# Patient Record
Sex: FEMALE | Race: WHITE | Employment: OTHER | URBAN - METROPOLITAN AREA
[De-identification: names, ages, dates, MRNs, and addresses within clinical notes are randomized per-mention and may not be internally consistent; named-entity substitution may affect disease eponyms.]

---

## 2017-03-12 DIAGNOSIS — I10 ESSENTIAL HYPERTENSION, BENIGN: ICD-10-CM

## 2017-03-12 NOTE — TELEPHONE ENCOUNTER
cozaar      Last Written Prescription Date: 11/07/16   Last Fill Quantity: 60, # refills: 11  Last Office Visit with [facility] or [facility] prescribing provider: 11/04/16       Potassium   Date Value Ref Range Status   10/19/2016 4.4 3.4 - 5.3 mmol/L Final     Creatinine   Date Value Ref Range Status   10/19/2016 0.90 0.52 - 1.04 mg/dL Final     BP Readings from Last 3 Encounters:   12/07/16 139/81   12/01/16 152/82   11/04/16 136/82

## 2017-03-13 RX ORDER — LOSARTAN POTASSIUM 50 MG/1
100 TABLET ORAL DAILY
Qty: 60 TABLET | Refills: 4 | Status: SHIPPED | OUTPATIENT
Start: 2017-03-13 | End: 2017-08-25

## 2017-04-05 ENCOUNTER — RADIANT APPOINTMENT (OUTPATIENT)
Dept: MAMMOGRAPHY | Facility: CLINIC | Age: 72
End: 2017-04-05
Attending: FAMILY MEDICINE
Payer: COMMERCIAL

## 2017-04-05 DIAGNOSIS — Z12.31 VISIT FOR SCREENING MAMMOGRAM: ICD-10-CM

## 2017-04-05 PROCEDURE — G0202 SCR MAMMO BI INCL CAD: HCPCS | Mod: TC

## 2017-05-03 ENCOUNTER — OFFICE VISIT (OUTPATIENT)
Dept: AUDIOLOGY | Facility: CLINIC | Age: 72
End: 2017-05-03
Payer: COMMERCIAL

## 2017-05-03 DIAGNOSIS — H90.3 SENSORINEURAL HEARING LOSS, BILATERAL: Primary | ICD-10-CM

## 2017-05-03 PROCEDURE — V5299 HEARING SERVICE: HCPCS | Performed by: AUDIOLOGIST

## 2017-05-03 NOTE — MR AVS SNAPSHOT
After Visit Summary   5/3/2017    Meliza Pereira    MRN: [id number]           Patient Information     Date Of Birth          1945        Visit Information        Provider Department      5/3/2017 3:00 PM Ruba Fitzpatrick AuD [facility]        Today's Diagnoses     Sensorineural hearing loss, bilateral    -  1       Follow-ups after your visit        Who to contact     If you have questions or need follow up information about today's clinic visit or your schedule please contact [facility] directly at [phone].  Normal or non-critical lab and imaging results will be communicated to you by Galavantierhart, letter or phone within 4 business days after the clinic has received the results. If you do not hear from us within 7 days, please contact the clinic through Blippext or phone. If you have a critical or abnormal lab result, we will notify you by phone as soon as possible.  Submit refill requests through SupplyHog or call your pharmacy and they will forward the refill request to us. Please allow 3 business days for your refill to be completed.          Additional Information About Your Visit        MyChart Information     SupplyHog gives you secure access to your electronic health record. If you see a primary care provider, you can also send messages to your care team and make appointments. If you have questions, please call your primary care clinic.  If you do not have a primary care provider, please call [phone] and they will assist you.        Care EveryWhere ID     This is your Care EveryWhere ID. This could be used by other organizations to access your [place] medical records  [id number]         Blood Pressure from Last 3 Encounters:   12/07/16 139/81   12/01/16 152/82   11/04/16 136/82    Weight from Last 3 Encounters:   12/07/16 150 lb 12.8 oz (68.4 kg)   11/04/16 156 lb 12.8 oz (71.1 kg)   10/24/16 159 lb (72.1 kg)              We Performed the Following      HEARING AID CHECK/NO CHARGE        Primary Care Provider Office Phone # Fax #    Maggi Lawler [phone]       [address] [address]        Thank you!     Thank you for choosing [facility]  for your care. Our goal is always to provide you with excellent care. Hearing back from our patients is one way we can continue to improve our services. Please take a few minutes to complete the written survey that you may receive in the mail after your visit with us. Thank you!             Your Updated Medication List - Protect others around you: Learn how to safely use, store and throw away your medicines at www.disposemymeds.org.          This list is accurate as of: 5/3/17  4:19 PM.  Always use your most recent med list.                   Brand Name Dispense Instructions for use    aspirin 81 MG tablet      Take 1 tablet by mouth daily.       CLARITIN 10 MG tablet   Generic drug:  loratadine     30 tablet    Take 1 tablet (10 mg) by mouth daily as needed for allergies       glucosamine-chondroitin 500-400 MG Caps per capsule      Take 3 capsules by mouth daily       ibuprofen 200 MG tablet    ADVIL/MOTRIN     Take 3 tablets (600 mg) by mouth every 6 hours as needed for pain (mild)       losartan 50 MG tablet    COZAAR    60 tablet    Take 2 tablets (100 mg) by mouth daily       omeprazole 20 MG CR capsule    priLOSEC    30 capsule    Take 1 capsule (20 mg) by mouth daily       UNABLE TO FIND      MEDICATION NAME: Isotonix

## 2017-05-03 NOTE — PROGRESS NOTES
72 year old female comes in with her 2010 Unitron Latitiude 16 Moxi hearing aids. She reports she is having trouble with the fit of the left hearing aid. She states she has not consistently used the hearing aids.    Examination reveals the retention tail is removed. Replaced left retention tail. Verified hearing aid functionality. See chart in the hearing aid room.    Long discussion on new hearing aid technology.     Recommend return to clinic for a hearing evaluation as she feels her hearing is much worse.    Ruba PROCTOR, #[id number]

## 2017-05-04 ENCOUNTER — MYC MEDICAL ADVICE (OUTPATIENT)
Dept: FAMILY MEDICINE | Facility: CLINIC | Age: 72
End: 2017-05-04

## 2017-05-04 DIAGNOSIS — H90.3 SENSORINEURAL HEARING LOSS, BILATERAL: Primary | ICD-10-CM

## 2017-05-16 ENCOUNTER — OFFICE VISIT (OUTPATIENT)
Dept: AUDIOLOGY | Facility: CLINIC | Age: 72
End: 2017-05-16
Payer: COMMERCIAL

## 2017-05-16 DIAGNOSIS — H90.3 SENSORINEURAL HEARING LOSS, BILATERAL: Primary | ICD-10-CM

## 2017-05-16 PROCEDURE — 92557 COMPREHENSIVE HEARING TEST: CPT | Performed by: AUDIOLOGIST

## 2017-05-16 NOTE — MR AVS SNAPSHOT
After Visit Summary   5/16/2017    Meliza Pereira    MRN: [id number]           Patient Information     Date Of Birth          1945        Visit Information        Provider Department      5/16/2017 1:00 PM Ruba Fitzpatrick AuD [facility]        Today's Diagnoses     Sensorineural hearing loss, bilateral    -  1       Follow-ups after your visit        Your next 10 appointments already scheduled     May 17, 2017 10:00 AM CDT   Return Visit with Prashanth Schroeder   [facility]    [address] [address]   [phone]              Who to contact     If you have questions or need follow up information about today's clinic visit or your schedule please contact [facility] directly at [phone].  Normal or non-critical lab and imaging results will be communicated to you by OmnyPayhart, letter or phone within 4 business days after the clinic has received the results. If you do not hear from us within 7 days, please contact the clinic through OmnyPayhart or phone. If you have a critical or abnormal lab result, we will notify you by phone as soon as possible.  Submit refill requests through Fave Media or call your pharmacy and they will forward the refill request to us. Please allow 3 business days for your refill to be completed.          Additional Information About Your Visit        MyChart Information     Fave Media gives you secure access to your electronic health record. If you see a primary care provider, you can also send messages to your care team and make appointments. If you have questions, please call your primary care clinic.  If you do not have a primary care provider, please call [phone] and they will assist you.        Care EveryWhere ID     This is your Care EveryWhere ID. This could be used by other organizations to access your [place] medical records  [id number]         Blood Pressure from Last 3  Encounters:   12/07/16 139/81   12/01/16 152/82   11/04/16 136/82    Weight from Last 3 Encounters:   12/07/16 150 lb 12.8 oz (68.4 kg)   11/04/16 156 lb 12.8 oz (71.1 kg)   10/24/16 159 lb (72.1 kg)              We Performed the Following     AUDIOGRAM/TYMPANOGRAM - INTERFACE     COMPREHENSIVE HEARING TEST        Primary Care Provider Office Phone # Fax #    Maggi Lawler [phone]       [facility] [address] [facility] [address]        Thank you!     Thank you for choosing [facility]  for your care. Our goal is always to provide you with excellent care. Hearing back from our patients is one way we can continue to improve our services. Please take a few minutes to complete the written survey that you may receive in the mail after your visit with us. Thank you!             Your Updated Medication List - Protect others around you: Learn how to safely use, store and throw away your medicines at www.disposemymeds.org.          This list is accurate as of: 5/16/17  3:30 PM.  Always use your most recent med list.                   Brand Name Dispense Instructions for use    aspirin 81 MG tablet      Take 1 tablet by mouth daily.       CLARITIN 10 MG tablet   Generic drug:  loratadine     30 tablet    Take 1 tablet (10 mg) by mouth daily as needed for allergies       glucosamine-chondroitin 500-400 MG Caps per capsule      Take 3 capsules by mouth daily       ibuprofen 200 MG tablet    ADVIL/MOTRIN     Take 3 tablets (600 mg) by mouth every 6 hours as needed for pain (mild)       losartan 50 MG tablet    COZAAR    60 tablet    Take 2 tablets (100 mg) by mouth daily       omeprazole 20 MG CR capsule    priLOSEC    30 capsule    Take 1 capsule (20 mg) by mouth daily       UNABLE TO FIND      MEDICATION NAME: Isotonix

## 2017-05-16 NOTE — PROGRESS NOTES
AUDIOLOGY REPORT    SUBJECTIVE:  Meliza Pereira is a 72 year old female who was seen in the Audiology Clinic at [facility] for an audiologic evaluation, referred by Dr. Lawler.  The patient has been seen previously in this clinic for assessment and results indicated a mild to moderate sensorineural hearing loss bilaterally. The patient reports she is having more difficulty with her hearing. She reports she has gone back to wearing her 2010 Unitron Latitude 10 Moxi hearing aids. The patient denies bilateral tinnitus, bilateral otalgia and bilateral drainage.     OBJECTIVE:  Otoscopic exam indicates ears are clear of cerumen bilaterally     Pure Tone Thresholds assessed using conventional audiometry with good  reliability from 250-8000 Hz bilaterally using insert earphones     RIGHT:  borderline-normal and moderate sensorineural hearing loss    LEFT:    borderline-normal and moderate sensorineural hearing loss    Tympanogram:    RIGHT: DNT    LEFT:   DNT    Speech Reception Threshold:    RIGHT: 30 dB HL    LEFT:   30 dB HL  Word Recognition Score:     RIGHT: 88% at 60 dB HL using W22 recorded word list.    LEFT:   92% at 70 dB HL using W22 recorded word list.      ASSESSMENT:   Borderline normal sloping to moderate sensorineural hearing loss bilaterally.     Compared to patient's previous audiogram dated 4/29/2010, hearing has improved in the lower tones and decreased slightly at 2000 Hz bilaterally. Today s results were discussed with the patient in detail.     PLAN: It is recommended that the patient return for hearing aid verification and reprogramming or to obtain newer hearing aids.. Patient was counseled regarding hearing loss and impact on communication. Patient is a good candidate for amplification at this time.A [facility] information packet was given to the patient. Please call this clinic with questions regarding these results or recommendations.        Ruba Fitzpatrick M.A.  F-AAA  Clinical audiologist [place] # [id number]  5/16/2017

## 2017-05-17 ENCOUNTER — OFFICE VISIT (OUTPATIENT)
Dept: AUDIOLOGY | Facility: CLINIC | Age: 72
End: 2017-05-17
Payer: COMMERCIAL

## 2017-05-17 DIAGNOSIS — H90.3 SENSORINEURAL HEARING LOSS, BILATERAL: Primary | ICD-10-CM

## 2017-05-17 PROCEDURE — 92593 HC HEARING AID CHECK, BINAURAL: CPT | Performed by: AUDIOLOGIST

## 2017-05-17 PROCEDURE — V5020 CONFORMITY EVALUATION: HCPCS | Performed by: AUDIOLOGIST

## 2017-05-17 NOTE — PROGRESS NOTES
72 year old female comes in for hearing aid reprogramming and verification. She is currently wearing 2010 Unitron Latitiude 16 Moxi hearing aids. She states she never wore the hearing aids all that much. She has been using them more the last few months but feels she is not hearing well with them.    Verified hearing aid functionality.      Hearing aids were reprogrammed and verified match to NAL-NL1 targets. Patient reports she is able to hear much better with these settings. Reviewed hearing aid settings, programs and volume control. See chart in the hearing aid room.     Return to clinic for hearing aid reprogramming in next 60 days.     Ruba CHAPPELL-BRENDON, #[id number]

## 2017-05-17 NOTE — MR AVS SNAPSHOT
After Visit Summary   5/17/2017    Meliza Pereira    MRN: [id number]           Patient Information     Date Of Birth          1945        Visit Information        Provider Department      5/17/2017 10:00 AM Ruba Fitzpatrick AuD [facility]        Today's Diagnoses     Sensorineural hearing loss, bilateral    -  1       Follow-ups after your visit        Who to contact     If you have questions or need follow up information about today's clinic visit or your schedule please contact [facility] directly at [phone].  Normal or non-critical lab and imaging results will be communicated to you by Wireless Safetyhart, letter or phone within 4 business days after the clinic has received the results. If you do not hear from us within 7 days, please contact the clinic through ProNova Solutionst or phone. If you have a critical or abnormal lab result, we will notify you by phone as soon as possible.  Submit refill requests through AppCard or call your pharmacy and they will forward the refill request to us. Please allow 3 business days for your refill to be completed.          Additional Information About Your Visit        MyChart Information     AppCard gives you secure access to your electronic health record. If you see a primary care provider, you can also send messages to your care team and make appointments. If you have questions, please call your primary care clinic.  If you do not have a primary care provider, please call [phone] and they will assist you.        Care EveryWhere ID     This is your Care EveryWhere ID. This could be used by other organizations to access your [place] medical records  [id number]         Blood Pressure from Last 3 Encounters:   12/07/16 139/81   12/01/16 152/82   11/04/16 136/82    Weight from Last 3 Encounters:   12/07/16 150 lb 12.8 oz (68.4 kg)   11/04/16 156 lb 12.8 oz (71.1 kg)   10/24/16 159 lb (72.1 kg)              We Performed the Following      HEARING AID CHECK, BINAURAL     HEARING AID CONFORMITY EVALUATION        Primary Care Provider Office Phone # Fax #    Maggi Gerald Lawler [phone]       [facility] [address] [place] [address]        Thank you!     Thank you for choosing [facility]  for your care. Our goal is always to provide you with excellent care. Hearing back from our patients is one way we can continue to improve our services. Please take a few minutes to complete the written survey that you may receive in the mail after your visit with us. Thank you!             Your Updated Medication List - Protect others around you: Learn how to safely use, store and throw away your medicines at www.disposemymeds.org.          This list is accurate as of: 5/17/17 11:02 AM.  Always use your most recent med list.                   Brand Name Dispense Instructions for use    aspirin 81 MG tablet      Take 1 tablet by mouth daily.       CLARITIN 10 MG tablet   Generic drug:  loratadine     30 tablet    Take 1 tablet (10 mg) by mouth daily as needed for allergies       glucosamine-chondroitin 500-400 MG Caps per capsule      Take 3 capsules by mouth daily       ibuprofen 200 MG tablet    ADVIL/MOTRIN     Take 3 tablets (600 mg) by mouth every 6 hours as needed for pain (mild)       losartan 50 MG tablet    COZAAR    60 tablet    Take 2 tablets (100 mg) by mouth daily       omeprazole 20 MG CR capsule    priLOSEC    30 capsule    Take 1 capsule (20 mg) by mouth daily       UNABLE TO FIND      MEDICATION NAME: Isotonix

## 2017-05-30 ENCOUNTER — TELEPHONE (OUTPATIENT)
Dept: AUDIOLOGY | Facility: CLINIC | Age: 72
End: 2017-05-30

## 2017-05-30 ENCOUNTER — OFFICE VISIT (OUTPATIENT)
Dept: AUDIOLOGY | Facility: CLINIC | Age: 72
End: 2017-05-30

## 2017-05-30 DIAGNOSIS — H90.3 SENSORINEURAL HEARING LOSS, BILATERAL: Primary | ICD-10-CM

## 2017-05-30 PROCEDURE — 99207 ZZC NO CHARGE LOS: CPT | Performed by: AUDIOLOGIST

## 2017-05-30 PROCEDURE — V5267 HEARING AID SUP/ACCESS/DEV: HCPCS | Performed by: AUDIOLOGIST

## 2017-05-30 NOTE — MR AVS SNAPSHOT
After Visit Summary   5/30/2017    Meliza Pereira    MRN: [id number]           Patient Information     Date Of Birth          1945        Visit Information        Provider Department      5/30/2017 2:30 PM Ruba Fitzpatrick AuD [facility]        Today's Diagnoses     Sensorineural hearing loss, bilateral    -  1       Follow-ups after your visit        Who to contact     If you have questions or need follow up information about today's clinic visit or your schedule please contact [facility] directly at [phone].  Normal or non-critical lab and imaging results will be communicated to you by Skimblehart, letter or phone within 4 business days after the clinic has received the results. If you do not hear from us within 7 days, please contact the clinic through Rivalroot or phone. If you have a critical or abnormal lab result, we will notify you by phone as soon as possible.  Submit refill requests through Connect HQ or call your pharmacy and they will forward the refill request to us. Please allow 3 business days for your refill to be completed.          Additional Information About Your Visit        MyChart Information     Connect HQ gives you secure access to your electronic health record. If you see a primary care provider, you can also send messages to your care team and make appointments. If you have questions, please call your primary care clinic.  If you do not have a primary care provider, please call [phone] and they will assist you.        Care EveryWhere ID     This is your Care EveryWhere ID. This could be used by other organizations to access your [place] medical records  [id number]         Blood Pressure from Last 3 Encounters:   12/07/16 139/81   12/01/16 152/82   11/04/16 136/82    Weight from Last 3 Encounters:   12/07/16 150 lb 12.8 oz (68.4 kg)   11/04/16 156 lb 12.8 oz (71.1 kg)   10/24/16 159 lb (72.1 kg)              We Performed the Following      HEARING AID SUPPLY        Primary Care Provider Office Phone # Fax #    Maggi Lawler [phone]       [facility] [address] [place] [address]        Thank you!     Thank you for choosing [facility]  for your care. Our goal is always to provide you with excellent care. Hearing back from our patients is one way we can continue to improve our services. Please take a few minutes to complete the written survey that you may receive in the mail after your visit with us. Thank you!             Your Updated Medication List - Protect others around you: Learn how to safely use, store and throw away your medicines at www.disposemymeds.org.          This list is accurate as of: 5/30/17  2:38 PM.  Always use your most recent med list.                   Brand Name Dispense Instructions for use    aspirin 81 MG tablet      Take 1 tablet by mouth daily.       CLARITIN 10 MG tablet   Generic drug:  loratadine     30 tablet    Take 1 tablet (10 mg) by mouth daily as needed for allergies       glucosamine-chondroitin 500-400 MG Caps per capsule      Take 3 capsules by mouth daily       ibuprofen 200 MG tablet    ADVIL/MOTRIN     Take 3 tablets (600 mg) by mouth every 6 hours as needed for pain (mild)       losartan 50 MG tablet    COZAAR    60 tablet    Take 2 tablets (100 mg) by mouth daily       omeprazole 20 MG CR capsule    priLOSEC    30 capsule    Take 1 capsule (20 mg) by mouth daily       UNABLE TO FIND      MEDICATION NAME: Isotonix

## 2017-05-30 NOTE — TELEPHONE ENCOUNTER
Discussed in-office repair options and cost. Patient will decide later if she wants to set up an appointment or just drop off the broken hearing aid at the specialty clinic .     Ruba PROCTOR, #[id number]

## 2017-05-30 NOTE — PROGRESS NOTES
72 year old female requests in-office repair of her left 2010 Unitron Latitude 16 Moxi 13 hearing aid. She reports her  wire is broken.     Replaced #3 xS left . Verified hearing aid functionality. See chart in the hearing aid room.     Will  hearing aid at the specialty clinic .     Return to clinic as needed.    Ruba PROCTOR, #[id number]

## 2017-05-30 NOTE — TELEPHONE ENCOUNTER
Reason for Call:  Other     Detailed comments: Pt has hearing aids that were just reset. She got up Saturday morning and the tube that connects the hearing aid is broken - Can this be replaced and at what cost?      Phone Number Patient can be reached at: Home number on file [phone] (home)    Best Time: Any    Can we leave a detailed message on this number? YES    Call taken on 5/30/2017 at 12:11 PM by Denise Behrendt

## 2017-08-25 ENCOUNTER — OFFICE VISIT (OUTPATIENT)
Dept: FAMILY MEDICINE | Facility: CLINIC | Age: 72
End: 2017-08-25
Payer: COMMERCIAL

## 2017-08-25 VITALS
HEIGHT: 65 IN | SYSTOLIC BLOOD PRESSURE: 154 MMHG | DIASTOLIC BLOOD PRESSURE: 90 MMHG | RESPIRATION RATE: 16 BRPM | WEIGHT: 158.8 LBS | TEMPERATURE: 97.6 F | HEART RATE: 80 BPM | BODY MASS INDEX: 26.46 KG/M2

## 2017-08-25 DIAGNOSIS — S90.851A FOREIGN BODY IN RIGHT FOOT, INITIAL ENCOUNTER: Primary | ICD-10-CM

## 2017-08-25 PROCEDURE — 28190 REMOVAL OF FOOT FOREIGN BODY: CPT | Performed by: NURSE PRACTITIONER

## 2017-08-25 ASSESSMENT — PAIN SCALES - GENERAL: PAINLEVEL: NO PAIN (0)

## 2017-08-25 NOTE — NURSING NOTE
Lidocaine 1% No Epi     NDC: 3112-9809-38  Lot #: [id number]  Ex. 03/01/2019    Provider had me draw up 0.5 ml of Lidocaine to use in procedure.   Pt's consent was obtained and Consent for surgery sent to scanning.     Nano Muhammad MA  1:13 PM 8/25/2017

## 2017-08-25 NOTE — NURSING NOTE
"Chief Complaint   Patient presents with     Foreign Body     thorn stuck in foot       Initial /90 (BP Location: Right arm, Patient Position: Chair, Cuff Size: Adult Regular)  Pulse 80  Temp 97.6  F (36.4  C) (Tympanic)  Resp 16  Ht 5' 5\" (1.651 m)  Wt 158 lb 12.8 oz (72 kg)  BMI 26.43 kg/m2 Estimated body mass index is 26.43 kg/(m^2) as calculated from the following:    Height as of this encounter: 5' 5\" (1.651 m).    Weight as of this encounter: 158 lb 12.8 oz (72 kg).  Medication Reconciliation: complete    Health Maintenance that is potentially due pending provider review:  NONE    Nano Muhammad MA  12:51 PM 8/25/2017  .    Is there anyone who you would like to be able to receive your results? Yes  If yes have patient fill out TOAN    "

## 2017-08-25 NOTE — MR AVS SNAPSHOT
After Visit Summary   8/25/2017    Meliza Pereira    MRN: [id number]           Patient Information     Date Of Birth          1945        Visit Information        Provider Department      8/25/2017 12:40 PM Flori Franks APRN CNP [facility]        Today's Diagnoses     Foreign body in right foot, initial encounter    -  1      Care Instructions    Soak foot in warm soapy water, pieces of the thorn may still work its way out     Keep covered with bandaid until fully healed    Try to stay off foot for the next day or so    If foot becomes swollen, red or warm or you have fevers return to clinic           Follow-ups after your visit        Who to contact     If you have questions or need follow up information about today's clinic visit or your schedule please contact [facility] directly at [phone].  Normal or non-critical lab and imaging results will be communicated to you by ReTargeterhart, letter or phone within 4 business days after the clinic has received the results. If you do not hear from us within 7 days, please contact the clinic through ReTargeterhart or phone. If you have a critical or abnormal lab result, we will notify you by phone as soon as possible.  Submit refill requests through Triplify or call your pharmacy and they will forward the refill request to us. Please allow 3 business days for your refill to be completed.          Additional Information About Your Visit        MyChart Information     Triplify gives you secure access to your electronic health record. If you see a primary care provider, you can also send messages to your care team and make appointments. If you have questions, please call your primary care clinic.  If you do not have a primary care provider, please call [phone] and they will assist you.        Care EveryWhere ID     This is your Care EveryWhere ID. This could be used by other organizations to access your [facility]  "medical records  [id number]        Your Vitals Were     Pulse Temperature Respirations Height BMI (Body Mass Index)       80 97.6  F (36.4  C) (Tympanic) 16 5' 5\" (1.651 m) 26.43 kg/m2        Blood Pressure from Last 3 Encounters:   08/25/17 154/90   12/07/16 139/81   12/01/16 152/82    Weight from Last 3 Encounters:   08/25/17 158 lb 12.8 oz (72 kg)   12/07/16 150 lb 12.8 oz (68.4 kg)   11/04/16 156 lb 12.8 oz (71.1 kg)              Today, you had the following     No orders found for display         Today's Medication Changes          These changes are accurate as of: 8/25/17  1:25 PM.  If you have any questions, ask your nurse or doctor.               These medicines have changed or have updated prescriptions.        Dose/Directions    losartan 50 MG tablet   Commonly known as:  COZAAR   This may have changed:  See the new instructions.   Used for:  Essential hypertension, benign   Changed by:  Maggi Lawler MD        TAKE TWO TABLETS (100MG) BY MOUTH ONCE DAILY   Quantity:  60 tablet   Refills:  0            Where to get your medicines      These medications were sent to [facility] [address] [place] [address]  [address] [facility] [address]     Phone:  [phone]     losartan 50 MG tablet                Primary Care Provider Office Phone # Fax #    Maggi Lawler [phone] [address] [place] [address]        Equal Access to Services     FREDDY GALE AH: Hadii chandrakant ku hadasho Soomaali, waaxda luqadaha, qaybta kaalmada adeegyada, waxay denis urrutia. So [facility] [phone].    ATENCIÓN: Si habla español, tiene a fraire disposición servicios gratuitos de asistencia lingüística. Llame al [phone].    We comply with applicable federal civil rights laws and [place] laws. We do not discriminate on the basis of race, color, national origin, age, disability sex, sexual orientation or gender identity.          "   Thank you!     Thank you for choosing [facility]  for your care. Our goal is always to provide you with excellent care. Hearing back from our patients is one way we can continue to improve our services. Please take a few minutes to complete the written survey that you may receive in the mail after your visit with us. Thank you!             Your Updated Medication List - Protect others around you: Learn how to safely use, store and throw away your medicines at www.disposemymeds.org.          This list is accurate as of: 8/25/17  1:25 PM.  Always use your most recent med list.                   Brand Name Dispense Instructions for use Diagnosis    aspirin 81 MG tablet      Take 1 tablet by mouth daily.    Chronic ischemic heart disease, unspecified, Other and unspecified hyperlipidemia, Unspecified essential hypertension       CLARITIN 10 MG tablet   Generic drug:  loratadine     30 tablet    Take 1 tablet (10 mg) by mouth daily as needed for allergies        glucosamine-chondroitin 500-400 MG Caps per capsule      Take 3 capsules by mouth daily        ibuprofen 200 MG tablet    ADVIL/MOTRIN     Take 3 tablets (600 mg) by mouth every 6 hours as needed for pain (mild)    Cyst of right ovary       losartan 50 MG tablet    COZAAR    60 tablet    TAKE TWO TABLETS (100MG) BY MOUTH ONCE DAILY    Essential hypertension, benign       omeprazole 20 MG CR capsule    priLOSEC    30 capsule    Take 1 capsule (20 mg) by mouth daily    Gastroesophageal reflux disease without esophagitis       UNABLE TO FIND      MEDICATION NAME: Isotonix

## 2017-08-25 NOTE — PROGRESS NOTES
SUBJECTIVE:   Meliza Pereira is a 72 year old female who presents to clinic today for the following health issues:      Concern - Thorn Stuck in Foot  Onset: x 1 month    Description:   Feels like there is a thorn in center of right foot on the bottom    Intensity: mild    Progression of Symptoms:  same    Accompanying Signs & Symptoms:  Painful when stepped on, denies redness or swelling    Previous history of similar problem:   None    Precipitating factors:   Worsened by: NA    Alleviating factors:  Improved by: NA    Therapies Tried and outcome: Using Tweezers      Doesn't hurt very much, every once in a while will step on and know it's there   Tried getting out multiple times using tweezers   Is bothering more now         Problem list and histories reviewed & adjusted, as indicated.  Additional history: as documented    Patient Active Problem List   Diagnosis     Essential hypertension, benign     Sensorineural Hearing Loss, Bilateral     Pain in joint, multiple sites     Lyme disease     HYPERLIPIDEMIA LDL GOAL <130     Osteoarthritis     Advanced directives, counseling/discussion     Vertigo, benign positional     Osteopenia     S/P total knee arthroplasty     Anxiety     Postmenopausal     Irritable bowel syndrome with diarrhea     Gastroesophageal reflux disease without esophagitis     Tubular adenoma     Past Surgical History:   Procedure Laterality Date     ARTHROPLASTY KNEE  8/8/2013    Procedure: ARTHROPLASTY KNEE;  Left Total Knee Arthroplasty--Anesth.Choice;  Surgeon: Jey Fitzpatrick MD;  Location: [facility] OR     ARTHROSCOPY KNEE WITH MEDIAL MENISCECTOMY  12/20/2012    Procedure: ARTHROSCOPY KNEE WITH MEDIAL MENISCECTOMY;  Left knee arthroscopy, partial medial menisectomy, possible chondroplasty medial femoral condyle-Choice anesthesia;  Surgeon: Jey Fitzpatrick MD;  Location: [facility] OR     BIOPSY      two lumps in left breast were biopsied some time in the mid     CHOLECYSTECTOMY  11/98      COLONOSCOPY      some time within the last ten years.     CYSTOSCOPY N/A 10/24/2016    Procedure: CYSTOSCOPY;  Surgeon: Tone Kevin MD;  Location: [facility] OR     HYSTERECTOMY VAGINAL       HYSTERECTOMY, PAP NO LONGER INDICATED      Pelvic no longer indicated     LAPAROSCOPIC SALPINGO-OOPHORECTOMY Bilateral 10/24/2016    Procedure: LAPAROSCOPIC SALPINGO-OOPHORECTOMY;  Surgeon: Tone Kevin MD;  Location: [facility] OR     SURGICAL HISTORY OF -       Breast biopsy     SURGICAL HISTORY OF -       D & C     SURGICAL HISTORY OF -       T & A as a child     SURGICAL HISTORY OF -       Gladys.     SURGICAL HISTORY OF -       Foot surgery       Social History   Substance Use Topics     Smoking status: Former Smoker     Smokeless tobacco: Never Used      Comment:  quit     Alcohol use Yes      Comment: 2/week     Family History   Problem Relation Age of Onset     C.A.D. Mother      HEART DISEASE Mother      CANCER Maternal Grandmother      uterine     Arthritis Maternal Grandmother      Arthritis Maternal Grandfather      HEART DISEASE Paternal Grandmother      HEART DISEASE Paternal Grandfather      Lipids Brother      Depression Son      Depression Daughter      HEART DISEASE Other       at young age of heart disease     CEREBROVASCULAR DISEASE Brother      Depression Father      Obesity Father          Current Outpatient Prescriptions   Medication Sig Dispense Refill     losartan (COZAAR) 50 MG tablet TAKE TWO TABLETS (100MG) BY MOUTH ONCE DAILY 60 tablet 0     UNABLE TO FIND MEDICATION NAME: Isotonix       ibuprofen (ADVIL,MOTRIN) 200 MG tablet Take 3 tablets (600 mg) by mouth every 6 hours as needed for pain (mild)       glucosamine-chondroitin 500-400 MG CAPS Take 3 capsules by mouth daily        loratadine (CLARITIN) 10 MG tablet Take 1 tablet (10 mg) by mouth daily as needed for allergies 30 tablet      omeprazole (PRILOSEC) 20 MG capsule Take 1 capsule (20 mg) by mouth daily  "(Patient not taking: Reported on 8/25/2017) 30 capsule 11     aspirin 81 MG tablet Take 1 tablet by mouth daily.  3     Allergies   Allergen Reactions     Cephalosporins Anaphylaxis     Levaquin Other (See Comments) and Difficulty breathing     Penicillins Anaphylaxis     Percocet [Oxycodone-Acetaminophen] Anaphylaxis     Atorvastatin Calcium Other (See Comments)     Joint pain     Seasonal Allergies      Vancomycin Other (See Comments)     Red itching scalp     Zocor [Hmg-Coa-R Inhibitors] Other (See Comments)     Joint pain from all statins         Reviewed and updated as needed this visit by clinical staffTobacco  Allergies  Meds  Problems  Med Hx  Surg Hx  Fam Hx  Soc Hx        Reviewed and updated as needed this visit by Provider  Allergies  Meds  Problems         ROS:  Constitutional, HEENT, cardiovascular, pulmonary, gi and gu systems are negative, except as otherwise noted.      OBJECTIVE:   /90 (BP Location: Right arm, Patient Position: Chair, Cuff Size: Adult Regular)  Pulse 80  Temp 97.6  F (36.4  C) (Tympanic)  Resp 16  Ht 5' 5\" (1.651 m)  Wt 158 lb 12.8 oz (72 kg)  BMI 26.43 kg/m2  Body mass index is 26.43 kg/(m^2).  GENERAL APPEARANCE: healthy, alert and no distress  SKIN: thorn subcutaneously embedded in the under side of right foot without any erythema, surrounding swelling or warmth.     Area cleansed with alcohol and injected with 0.5 ml of lidocaine without epi. Area cleansed again with betadine and a small 0.3 mm incision was made over site of thorn on bottom of foot. Utilizing tweezers pieces of dark debris were removed. Patient tolerated well. Area cleaned up and bacitracin applied and covered with band aid.     Diagnostic Test Results:  none     ASSESSMENT/PLAN:       1. Foreign body in right foot, initial encounter  Patient presents with a thorn in the bottom of her foot for 1 month, now embedded under the skin and painful. Pieces of debris removed as above and area " covered with bacitracin and band aid - patient informed of signs and symptoms of infection and to return to clinic if any. Patient agrees.   - REMOVAL FOREIGN BODY FOOT, SUBCUTANEOUS    Patient Instructions   Soak foot in warm soapy water, pieces of the thorn may still work its way out     Keep covered with bandaid until fully healed    Try to stay off foot for the next day or so    If foot becomes swollen, red or warm or you have fevers return to clinic       WILFRED Padilla [facility]

## 2017-08-25 NOTE — PATIENT INSTRUCTIONS
Soak foot in warm soapy water, pieces of the thorn may still work its way out     Keep covered with bandaid until fully healed    Try to stay off foot for the next day or so    If foot becomes swollen, red or warm or you have fevers return to clinic

## 2017-09-30 DIAGNOSIS — I10 ESSENTIAL HYPERTENSION, BENIGN: ICD-10-CM

## 2017-09-30 NOTE — TELEPHONE ENCOUNTER
losartan (COZAAR) 50 MG tablet      Last Written Prescription Date: 8/25/17  Last Fill Quantity: 60, # refills: 0  Last Office Visit with [facility], [facility] or [facility] prescribing provider: 8/25/17       Potassium   Date Value Ref Range Status   10/19/2016 4.4 3.4 - 5.3 mmol/L Final     Creatinine   Date Value Ref Range Status   10/19/2016 0.90 0.52 - 1.04 mg/dL Final     BP Readings from Last 3 Encounters:   08/25/17 154/90   12/07/16 139/81   12/01/16 152/82     Leeanne WEBBER)

## 2017-10-02 RX ORDER — LOSARTAN POTASSIUM 50 MG/1
TABLET ORAL
Qty: 60 TABLET | Refills: 0 | Status: SHIPPED | OUTPATIENT
Start: 2017-10-02 | End: 2017-10-20

## 2017-10-20 ENCOUNTER — OFFICE VISIT (OUTPATIENT)
Dept: FAMILY MEDICINE | Facility: CLINIC | Age: 72
End: 2017-10-20
Payer: COMMERCIAL

## 2017-10-20 VITALS
TEMPERATURE: 97.4 F | DIASTOLIC BLOOD PRESSURE: 78 MMHG | WEIGHT: 159.2 LBS | SYSTOLIC BLOOD PRESSURE: 136 MMHG | HEART RATE: 80 BPM | BODY MASS INDEX: 26.52 KG/M2 | HEIGHT: 65 IN

## 2017-10-20 DIAGNOSIS — Z00.01 ENCOUNTER FOR WELL ADULT EXAM WITH ABNORMAL FINDINGS: Primary | ICD-10-CM

## 2017-10-20 DIAGNOSIS — I10 ESSENTIAL HYPERTENSION, BENIGN: ICD-10-CM

## 2017-10-20 DIAGNOSIS — N89.8 VAGINAL DISCHARGE: ICD-10-CM

## 2017-10-20 DIAGNOSIS — Z11.59 NEED FOR HEPATITIS C SCREENING TEST: ICD-10-CM

## 2017-10-20 DIAGNOSIS — K21.9 GASTROESOPHAGEAL REFLUX DISEASE WITHOUT ESOPHAGITIS: ICD-10-CM

## 2017-10-20 LAB
ALBUMIN SERPL-MCNC: 3.6 G/DL (ref 3.4–5)
ALP SERPL-CCNC: 99 U/L (ref 40–150)
ALT SERPL W P-5'-P-CCNC: 31 U/L (ref 0–50)
ANION GAP SERPL CALCULATED.3IONS-SCNC: 3 MMOL/L (ref 3–14)
AST SERPL W P-5'-P-CCNC: 18 U/L (ref 0–45)
BASOPHILS # BLD AUTO: 0 10E9/L (ref 0–0.2)
BASOPHILS NFR BLD AUTO: 0.4 %
BILIRUB DIRECT SERPL-MCNC: 0.1 MG/DL (ref 0–0.2)
BILIRUB SERPL-MCNC: 0.6 MG/DL (ref 0.2–1.3)
BUN SERPL-MCNC: 14 MG/DL (ref 7–30)
CALCIUM SERPL-MCNC: 9.8 MG/DL (ref 8.5–10.1)
CHLORIDE SERPL-SCNC: 110 MMOL/L (ref 94–109)
CHOLEST SERPL-MCNC: 216 MG/DL
CO2 SERPL-SCNC: 28 MMOL/L (ref 20–32)
CREAT SERPL-MCNC: 0.9 MG/DL (ref 0.52–1.04)
DIFFERENTIAL METHOD BLD: NORMAL
EOSINOPHIL # BLD AUTO: 0.3 10E9/L (ref 0–0.7)
EOSINOPHIL NFR BLD AUTO: 4.2 %
ERYTHROCYTE [DISTWIDTH] IN BLOOD BY AUTOMATED COUNT: 12.8 % (ref 10–15)
GFR SERPL CREATININE-BSD FRML MDRD: 61 ML/MIN/1.7M2
GLUCOSE SERPL-MCNC: 100 MG/DL (ref 70–99)
HCT VFR BLD AUTO: 41.3 % (ref 35–47)
HDLC SERPL-MCNC: 48 MG/DL
HGB BLD-MCNC: 14.5 G/DL (ref 11.7–15.7)
LDLC SERPL CALC-MCNC: 143 MG/DL
LYMPHOCYTES # BLD AUTO: 1.3 10E9/L (ref 0.8–5.3)
LYMPHOCYTES NFR BLD AUTO: 17.4 %
MCH RBC QN AUTO: 30 PG (ref 26.5–33)
MCHC RBC AUTO-ENTMCNC: 35.1 G/DL (ref 31.5–36.5)
MCV RBC AUTO: 86 FL (ref 78–100)
MONOCYTES # BLD AUTO: 0.5 10E9/L (ref 0–1.3)
MONOCYTES NFR BLD AUTO: 7.2 %
NEUTROPHILS # BLD AUTO: 5.1 10E9/L (ref 1.6–8.3)
NEUTROPHILS NFR BLD AUTO: 70.8 %
NONHDLC SERPL-MCNC: 168 MG/DL
PLATELET # BLD AUTO: 181 10E9/L (ref 150–450)
POTASSIUM SERPL-SCNC: 4.7 MMOL/L (ref 3.4–5.3)
PROT SERPL-MCNC: 6.9 G/DL (ref 6.8–8.8)
RBC # BLD AUTO: 4.83 10E12/L (ref 3.8–5.2)
SODIUM SERPL-SCNC: 141 MMOL/L (ref 133–144)
SPECIMEN SOURCE: NORMAL
TRIGL SERPL-MCNC: 123 MG/DL
TSH SERPL DL<=0.005 MIU/L-ACNC: 2.4 MU/L (ref 0.4–4)
WBC # BLD AUTO: 7.2 10E9/L (ref 4–11)
WET PREP SPEC: NORMAL

## 2017-10-20 PROCEDURE — 85025 COMPLETE CBC W/AUTO DIFF WBC: CPT | Performed by: FAMILY MEDICINE

## 2017-10-20 PROCEDURE — 80061 LIPID PANEL: CPT | Performed by: FAMILY MEDICINE

## 2017-10-20 PROCEDURE — 99213 OFFICE O/P EST LOW 20 MIN: CPT | Mod: 25 | Performed by: FAMILY MEDICINE

## 2017-10-20 PROCEDURE — 84443 ASSAY THYROID STIM HORMONE: CPT | Performed by: FAMILY MEDICINE

## 2017-10-20 PROCEDURE — 87210 SMEAR WET MOUNT SALINE/INK: CPT | Performed by: FAMILY MEDICINE

## 2017-10-20 PROCEDURE — 36415 COLL VENOUS BLD VENIPUNCTURE: CPT | Performed by: FAMILY MEDICINE

## 2017-10-20 PROCEDURE — G0438 PPPS, INITIAL VISIT: HCPCS | Performed by: FAMILY MEDICINE

## 2017-10-20 PROCEDURE — 86803 HEPATITIS C AB TEST: CPT | Performed by: FAMILY MEDICINE

## 2017-10-20 PROCEDURE — 80048 BASIC METABOLIC PNL TOTAL CA: CPT | Performed by: FAMILY MEDICINE

## 2017-10-20 PROCEDURE — 80076 HEPATIC FUNCTION PANEL: CPT | Performed by: FAMILY MEDICINE

## 2017-10-20 RX ORDER — LOSARTAN POTASSIUM 50 MG/1
100 TABLET ORAL DAILY
Qty: 60 TABLET | Refills: 11 | Status: SHIPPED | OUTPATIENT
Start: 2017-10-20 | End: 2018-01-05

## 2017-10-20 NOTE — NURSING NOTE
"Chief Complaint   Patient presents with     Physical       Initial BP (!) 170/94 (BP Location: Right arm, Patient Position: Chair, Cuff Size: Adult Large)  Pulse 80  Temp 97.4  F (36.3  C) (Tympanic)  Ht 5' 5\" (1.651 m)  Wt 159 lb 3.2 oz (72.2 kg)  BMI 26.49 kg/m2 Estimated body mass index is 26.49 kg/(m^2) as calculated from the following:    Height as of this encounter: 5' 5\" (1.651 m).    Weight as of this encounter: 159 lb 3.2 oz (72.2 kg).  Medication Reconciliation: complete    Health Maintenance that is potentially due pending provider review:  NONE    n/a    Is there anyone who you would like to be able to receive your results? Yes  If yes have patient fill out TOAN    "

## 2017-10-20 NOTE — MR AVS SNAPSHOT
After Visit Summary   10/20/2017    Meliza Pereira    MRN: [id number]           Patient Information     Date Of Birth          1945        Visit Information        Provider Department      10/20/2017 8:40 AM Maggi Lawler MD [facility]        Today's Diagnoses     Encounter for well adult exam with abnormal findings    -  1    Vaginal discharge        BENIGN HYPERTENSION        Gastroesophageal reflux disease without esophagitis        Need for hepatitis C screening test          Care Instructions      Preventive Health Recommendations  Female Ages 65 +    Yearly exam:     See your health care provider every year in order to  o Review health changes.   o Discuss preventive care.    o Review your medicines if your doctor has prescribed any.      You no longer need a yearly Pap test unless you've had an abnormal Pap test in the past 10 years. If you have vaginal symptoms, such as bleeding or discharge, be sure to talk with your provider about a Pap test.      Every 1 to 2 years, have a mammogram.  If you are over 69, talk with your health care provider about whether or not you want to continue having screening mammograms.      Every 10 years, have a colonoscopy. Or, have a yearly FIT test (stool test). These exams will check for colon cancer.       Have a cholesterol test every 5 years, or more often if your doctor advises it.       Have a diabetes test (fasting glucose) every three years. If you are at risk for diabetes, you should have this test more often.       At age 65, have a bone density scan (DEXA) to check for osteoporosis (brittle bone disease).    Shots:    Get a flu shot each year.    Get a tetanus shot every 10 years.    Talk to your doctor about your pneumonia vaccines. There are now two you should receive - Pneumovax (PPSV 23) and Prevnar (PCV 13).    Talk to your doctor about the shingles vaccine.    Talk to your doctor about the hepatitis B  vaccine.    Nutrition:     Eat at least 5 servings of fruits and vegetables each day.      Eat whole-grain bread, whole-wheat pasta and brown rice instead of white grains and rice.      Talk to your provider about Calcium and Vitamin D.     Lifestyle    Exercise at least 150 minutes a week (30 minutes a day, 5 days a week). This will help you control your weight and prevent disease.      Limit alcohol to one drink per day.      No smoking.       Wear sunscreen to prevent skin cancer.       See your dentist twice a year for an exam and cleaning.      See your eye doctor every 1 to 2 years to screen for conditions such as glaucoma, macular degeneration, cataracts, etc     Labs today     Medications refilled     Try adjusting metamucil dose up or down for formed stool goal    Pneumonia shot at pharmacy           Follow-ups after your visit        Who to contact     If you have questions or need follow up information about today's clinic visit or your schedule please contact [facility] directly at [phone].  Normal or non-critical lab and imaging results will be communicated to you by Ybrant Digitalhart, letter or phone within 4 business days after the clinic has received the results. If you do not hear from us within 7 days, please contact the clinic through dBMEDx or phone. If you have a critical or abnormal lab result, we will notify you by phone as soon as possible.  Submit refill requests through dBMEDx or call your pharmacy and they will forward the refill request to us. Please allow 3 business days for your refill to be completed.          Additional Information About Your Visit        [employer] Information     dBMEDx gives you secure access to your electronic health record. If you see a primary care provider, you can also send messages to your care team and make appointments. If you have questions, please call your primary care clinic.  If you do not have a primary care provider, please call  "[phone] and they will assist you.        Care EveryWhere ID     This is your Care EveryWhere ID. This could be used by other organizations to access your [place] medical records  [id number]        Your Vitals Were     Pulse Temperature Height BMI (Body Mass Index)          80 97.4  F (36.3  C) (Tympanic) 5' 5\" (1.651 m) 26.49 kg/m2         Blood Pressure from Last 3 Encounters:   10/20/17 (!) 170/94   08/25/17 154/90   12/07/16 139/81    Weight from Last 3 Encounters:   10/20/17 159 lb 3.2 oz (72.2 kg)   08/25/17 158 lb 12.8 oz (72 kg)   12/07/16 150 lb 12.8 oz (68.4 kg)              We Performed the Following     Basic metabolic panel     CBC with platelets differential     Hepatic panel     Hepatitis C Screen Reflex to HCV RNA Quant and Genotype     Lipid panel reflex to direct LDL     TSH with free T4 reflex     Wet prep          Today's Medication Changes          These changes are accurate as of: 10/20/17  9:37 AM.  If you have any questions, ask your nurse or doctor.               These medicines have changed or have updated prescriptions.        Dose/Directions    losartan 50 MG tablet   Commonly known as:  COZAAR   This may have changed:  See the new instructions.   Used for:  Essential hypertension, benign   Changed by:  Maggi Lawler MD        Dose:  100 mg   Take 2 tablets (100 mg) by mouth daily   Quantity:  60 tablet   Refills:  11            Where to get your medicines      These medications were sent to [facility] [address] [place] [address]  [address] [facility] [address]     Phone:  [phone]     losartan 50 MG tablet    omeprazole 20 MG CR capsule                Primary Care Provider Office Phone # Fax #    Maggi Lawler [phone] [address] [place] [address]        Equal Access to Services     FREDDY GALE AH: Rosemary Jacobson, wajanayda luqadaha, qaybta kaalmada aderachel, charles carbone " zuleyma damonteodorodarlene mclain'aaidalia ah. So [facility] [phone].    ATENCIÓN: Si bonilla rivera, tiene a fraire disposición servicios gratuitos de asistencia lingüística. Latasha padilla [phone].    We comply with applicable federal civil rights laws and [place] laws. We do not discriminate on the basis of race, color, national origin, age, disability, sex, sexual orientation, or gender identity.            Thank you!     Thank you for choosing [facility]  for your care. Our goal is always to provide you with excellent care. Hearing back from our patients is one way we can continue to improve our services. Please take a few minutes to complete the written survey that you may receive in the mail after your visit with us. Thank you!             Your Updated Medication List - Protect others around you: Learn how to safely use, store and throw away your medicines at www.disposemymeds.org.          This list is accurate as of: 10/20/17  9:37 AM.  Always use your most recent med list.                   Brand Name Dispense Instructions for use Diagnosis    aspirin 81 MG tablet      Take 1 tablet by mouth daily.    Chronic ischemic heart disease, unspecified, Other and unspecified hyperlipidemia, Unspecified essential hypertension       CLARITIN 10 MG tablet   Generic drug:  loratadine     30 tablet    Take 1 tablet (10 mg) by mouth daily as needed for allergies        glucosamine-chondroitin 500-400 MG Caps per capsule      Take 3 capsules by mouth daily        ibuprofen 200 MG tablet    ADVIL/MOTRIN     Take 3 tablets (600 mg) by mouth every 6 hours as needed for pain (mild)    Cyst of right ovary       losartan 50 MG tablet    COZAAR    60 tablet    Take 2 tablets (100 mg) by mouth daily    Essential hypertension, benign       omeprazole 20 MG CR capsule    priLOSEC    30 capsule    Take 1 capsule (20 mg) by mouth daily    Gastroesophageal reflux disease without esophagitis       psyllium 58.6 % Powd    METAMUCIL     Take by  mouth daily        UNABLE TO FIND      MEDICATION NAME: Isotonix

## 2017-10-20 NOTE — PATIENT INSTRUCTIONS

## 2017-10-20 NOTE — PROGRESS NOTES
SUBJECTIVE:   Meliza Pereira is a 72 year old female who presents for Preventive Visit.      Are you in the first 12 months of your Medicare Part B coverage?  No    Healthy Habits:    Do you get at least three servings of calcium containing foods daily (dairy, green leafy vegetables, etc.)? yes    Amount of exercise or daily activities, outside of work: 6 day(s) per week    Problems taking medications regularly No    Medication side effects: No    Have you had an eye exam in the past two years? yes    Do you see a dentist twice per year? yes    Do you have sleep apnea, excessive snoring or daytime drowsiness?yes, daytime drowsiness    COGNITIVE SCREEN  1) Repeat 3 items (Banana, Sunrise, Chair)    2) Clock draw: NORMAL  3) 3 item recall: Recalls 3 objects  Results: 3 items recalled: COGNITIVE IMPAIRMENT LESS LIKELY    Mini-CogTM Copyright S Juanpablo. Licensed by the author for use in [facility] [employer]; reprinted with permission (zahra@[email or web address]). All rights reserved.          Vaginal discharge  Off and on for the last year  Some odor and some irritation   No urinary sxs at all     IBS   She has reg stools that are loose and some fecal leakage at times   She is on metatmucil daily       Hypertension Follow-up      Outpatient blood pressures are being checked at home.  Results are 120-130/80.    Low Salt Diet: no added salt  Patient presents for evaluation of hypertension.  She indicates that she is feeling well and denies any symptoms referable to her elevated blood pressure. Specifically denies chest pain, palpitations, dyspnea, orthopnea, PND or peripheral edema. Current medication regimen is as listed below. Patient denies any side effects of medication.                Reviewed and updated as needed this visit by clinical staff  Tobacco  Allergies  Meds  Problems  Med Hx  Surg Hx  Fam Hx  Soc Hx          Reviewed and updated as needed this visit by Provider  Allergies  Meds  Problems        Social  History   Substance Use Topics     Smoking status: Former Smoker     Smokeless tobacco: Never Used      Comment: 1971 quit     Alcohol use Yes      Comment: 2/week       The patient does not drink >3 drinks per day nor >7 drinks per week.    Today's PHQ-2 Score:   PHQ-2 ( 1999 Pf[employer]) 12/22/2015 10/28/2013   Q1: Little interest or pleasure in doing things 0 0   Q2: Feeling down, depressed or hopeless 0 0   PHQ-2 Score 0 0         Do you feel safe in your environment - Yes    Do you have a Health Care Directive?: Yes: Advance Directive has been received and scanned.    Current providers sharing in care for this patient include: Patient Care Team:  Maggi Lawler MD as PCP - General      Hearing impairment: Yes, hearing loss, has hearing aids    Ability to successfully perform activities of daily living: Yes, no assistance needed     Fall risk:         Home safety:  throw rugs in the hallway and lack of grab bars in the bathroom      The following health maintenance items are reviewed in Epic and correct as of today:  Health Maintenance   Topic Date Due     HEPATITIS C SCREENING  03/16/1963     PNEUMOCOCCAL (1 of 2 - PCV13) 03/16/2011     INFLUENZA VACCINE (SYSTEM ASSIGNED)  09/01/2017     FALL RISK ASSESSMENT  10/19/2017     ADVANCE DIRECTIVE PLANNING Q5 YRS  12/18/2017     MAMMO SCREEN Q2 YR (SYSTEM ASSIGNED)  04/05/2019     LIPID SCREEN Q5 YR FEMALE (SYSTEM ASSIGNED)  12/22/2020     TETANUS IMMUNIZATION (SYSTEM ASSIGNED)  11/15/2021     COLONOSCOPY Q5 YR  12/01/2021     DEXA SCAN SCREENING (SYSTEM ASSIGNED)  Completed     Labs reviewed in EPIC    Pneumonia Vaccine:Adults age 65+ who received Pneumovax (PPSV23) at 65 years or older: Should be given PCV13 > 1 year after their most recent PPSV23  Mammogram Screening: Patient over age 50, mutual decision to screen reflected in health maintenance.    ROS:  Constitutional, HEENT, cardiovascular, pulmonary, GI, , musculoskeletal, neuro, skin, endocrine and psych  "systems are negative, except as otherwise noted.      OBJECTIVE:   /78  Pulse 80  Temp 97.4  F (36.3  C) (Tympanic)  Ht 5' 5\" (1.651 m)  Wt 159 lb 3.2 oz (72.2 kg)  BMI 26.49 kg/m2 Estimated body mass index is 26.49 kg/(m^2) as calculated from the following:    Height as of this encounter: 5' 5\" (1.651 m).    Weight as of this encounter: 159 lb 3.2 oz (72.2 kg).  EXAM:   GENERAL: healthy, alert and no distress  EYES: Eyes grossly normal to inspection, PERRL and conjunctivae and sclerae normal  HENT: ear canals and TM's normal, nose and mouth without ulcers or lesions  NECK: no adenopathy, no asymmetry, masses, or scars and thyroid normal to palpation  RESP: lungs clear to auscultation - no rales, rhonchi or wheezes  BREAST: normal without masses, tenderness or nipple discharge and no palpable axillary masses or adenopathy  CV: regular rate and rhythm, normal S1 S2, no S3 or S4, no murmur, click or rub, no peripheral edema and peripheral pulses strong  ABDOMEN: soft, nontender, no hepatosplenomegaly, no masses and bowel sounds normal   (female): vaginal mucosal atrophy  MS: no gross musculoskeletal defects noted, no edema  SKIN: no suspicious lesions or rashes  NEURO: Normal strength and tone, mentation intact and speech normal  PSYCH: mentation appears normal, affect normal/bright    ASSESSMENT / PLAN:   1. Encounter for well adult exam with abnormal findings      2. Vaginal discharge  Likely related to vaginal atrophy wet prep is negative   - Wet prep  - OFFICE/OUTPT VISIT,EST,LEVL III    3. BENIGN HYPERTENSION  Stable no change in treatment plan.   - losartan (COZAAR) 50 MG tablet; Take 2 tablets (100 mg) by mouth daily  Dispense: 60 tablet; Refill: 11  - Hepatic panel  - Basic metabolic panel  - CBC with platelets differential  - TSH with free T4 reflex  - Lipid panel reflex to direct LDL    4. Gastroesophageal reflux disease without esophagitis  Stable no change in treatment plan.   - omeprazole " "(PRILOSEC) 20 MG CR capsule; Take 1 capsule (20 mg) by mouth daily  Dispense: 30 capsule; Refill: 11    5. Need for hepatitis C screening test    - Hepatitis C Screen Reflex to HCV RNA Quant and Genotype    End of Life Planning:  Patient currently has an advanced directive: Yes.  Practitioner is supportive of decision.    COUNSELING:  Reviewed preventive health counseling, as reflected in patient instructions        Estimated body mass index is 26.49 kg/(m^2) as calculated from the following:    Height as of this encounter: 5' 5\" (1.651 m).    Weight as of this encounter: 159 lb 3.2 oz (72.2 kg).     reports that she has quit smoking. She has never used smokeless tobacco.        Appropriate preventive services were discussed with this patient, including applicable screening as appropriate for cardiovascular disease, diabetes, osteopenia/osteoporosis, and glaucoma.  As appropriate for age/gender, discussed screening for colorectal cancer, prostate cancer, breast cancer, and cervical cancer. Checklist reviewing preventive services available has been given to the patient.    Reviewed patients plan of care and provided an AVS. The Basic Care Plan (routine screening as documented in Health Maintenance) for Meliza meets the Care Plan requirement. This Care Plan has been established and reviewed with the Patient.    Counseling Resources:  ATP IV Guidelines  Pooled Cohorts Equation Calculator  Breast Cancer Risk Calculator  FRAX Risk Assessment  ICSI Preventive Guidelines  Dietary Guidelines for Americans, 2010  USDA's MyPlate  ASA Prophylaxis  Lung CA Screening    Maggi Lawler MD  [facility]  "

## 2017-10-23 LAB — HCV AB SERPL QL IA: NONREACTIVE

## 2017-11-24 ENCOUNTER — MYC MEDICAL ADVICE (OUTPATIENT)
Dept: FAMILY MEDICINE | Facility: CLINIC | Age: 72
End: 2017-11-24

## 2018-01-05 ENCOUNTER — MYC MEDICAL ADVICE (OUTPATIENT)
Dept: FAMILY MEDICINE | Facility: CLINIC | Age: 73
End: 2018-01-05

## 2018-01-05 DIAGNOSIS — I10 ESSENTIAL HYPERTENSION, BENIGN: ICD-10-CM

## 2018-01-05 RX ORDER — LOSARTAN POTASSIUM 50 MG/1
100 TABLET ORAL DAILY
Qty: 180 TABLET | Refills: 0 | Status: SHIPPED | OUTPATIENT
Start: 2018-01-05

## 2018-07-25 ENCOUNTER — MYC MEDICAL ADVICE (OUTPATIENT)
Dept: FAMILY MEDICINE | Facility: CLINIC | Age: 73
End: 2018-07-25

## 2019-11-02 ENCOUNTER — HEALTH MAINTENANCE LETTER (OUTPATIENT)
Age: 74
End: 2019-11-02

## 2020-02-10 ENCOUNTER — HEALTH MAINTENANCE LETTER (OUTPATIENT)
Age: 75
End: 2020-02-10

## 2020-11-16 ENCOUNTER — HEALTH MAINTENANCE LETTER (OUTPATIENT)
Age: 75
End: 2020-11-16

## 2021-04-03 ENCOUNTER — HEALTH MAINTENANCE LETTER (OUTPATIENT)
Age: 76
End: 2021-04-03

## 2021-09-12 ENCOUNTER — HEALTH MAINTENANCE LETTER (OUTPATIENT)
Age: 76
End: 2021-09-12

## 2021-11-07 ENCOUNTER — HEALTH MAINTENANCE LETTER (OUTPATIENT)
Age: 76
End: 2021-11-07

## 2022-04-24 ENCOUNTER — HEALTH MAINTENANCE LETTER (OUTPATIENT)
Age: 77
End: 2022-04-24

## 2022-11-19 ENCOUNTER — HEALTH MAINTENANCE LETTER (OUTPATIENT)
Age: 77
End: 2022-11-19

## 2023-06-01 ENCOUNTER — HEALTH MAINTENANCE LETTER (OUTPATIENT)
Age: 78
End: 2023-06-01